# Patient Record
(demographics unavailable — no encounter records)

---

## 2024-12-09 NOTE — HISTORY OF PRESENT ILLNESS
[de-identified] : PATIENT COMPLAINS OF RIGHT LOWER HIP  PATIENT STATES HE STARTED EXPERIENCING PAIN FROM THE RT HIP REGION SHOOTING DOWN TO THE LEG. PATIENT REPORTS NUMBNESS IN THE RIGHT FOOT AS WELL. PAIN BEGAN THE DAY BEFORE AND GOT WORSE THE PREVIOUS DAY. 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 (PULLING WEIGHT APRROX 70-80 LBS) IS YOUR PROBLEM GETTING WORSE - FEELS THE SAME  IS THIS WORKERS COMPENSATION INJURY/ NO FAULT: NO PAIN IS CONSTANT PAIN   4-5/10 DESCRIPTION OF PAIN: DULL, THROBBING WORSE AT PARTICULAR TIME OF DAY: NO PRIOR/CURRENT TREATMENTS: OTC MEDICATION - HELFUL  AGGRAVATING FACTORS:  WALKING, GOING UP STAIRS - WEAKNESS OCCURS IN THE RIGHT LEG  ANY SYMPTOMS: NONE  ASSOCIATED NUMBNESS    PREVIOUS DISLOCATION- NO HAS HAD PHYSICAL THERAPY WITHOUT RELIEF- NO HAS HAD PREVIOUS SURGERY- NO HAS HAD PREVIOUS INJECTION - NO  HAS HAD PREVIOUS IMAGING -  NO    Hemigard Intro: Due to skin fragility and wound tension, it was decided to use HEMIGARD adhesive retention suture devices to permit a linear closure. The skin was cleaned and dried for a 6cm distance away from the wound. Excessive hair, if present, was removed to allow for adhesion.

## 2024-12-09 NOTE — PHYSICAL EXAM
[de-identified] : .XRAY RIGHT HIP MILD JOINT NARROWING , SHELVING OSTEOPHYTE  CALCIFICATION IN THE ANTERIOR CAPSULE REGION   XRAYS LUMBAR SPINE - 2 VIEWS NO FRACTURE  -  NARROWING L45, L5SI DISC SPACED  10% LYSTHESIS L45

## 2024-12-09 NOTE — DISCUSSION/SUMMARY
[de-identified] : MEDROL DOSE PACK PROGRES DICLOFENAC HOME EXERCISES  PT REFERRAL PROVIDED  MRI IF NO RELIEF

## 2025-06-27 NOTE — HISTORY OF PRESENT ILLNESS
[de-identified] : REASON: BILATERAL  KNEE PAIN ( R>L)  DURATION: PAIN STARED JUNE 17, 2025- PT WAS WORKING OUT AT THE GYM , SQUATS ON MACHINE, KNEE GAVE OUT  DESCRIPTION OF PAIN: ACHY  PAIN LEVEL: 5/10 INTERMITTENT PRIOR TREATMENTS: REST, ICE, COMPRESSION, ELEVATIOB, ADVIL  AGGRAVATING FACTORS: USING THE STAIRS, AT NIGHT    HAS HAD PHYSICAL THERAPY - NO HAS HAD PREVIOUS SURGERY- NO HAS HAD PREVIOUS INJECTION -  NO

## 2025-06-27 NOTE — PHYSICAL EXAM
[de-identified] : PHYSICAL EXAM BILATERAL KNEES  MODERATE TO LARGE EFFUSION  AROM RIGHT  0- 105 LEFT    0-125  SPECIAL TESTS  PATELLAR GRIND = GRIND  DRAWER  = NEG LACHMAN = NEG MACMURRAY = PAINFUL   MOTOR = GROSSLY INTACT SENSORY = GROSSLY INTACT     [de-identified] : XRAY RIGHT KNEE: 4 views of the knee 3 COMPARTMENT GRADE 3-4  OSTEOARTHRITIS  No obvious fracture or dislocation.  Alignment within normal limits   XRAY LEFT KNEE: 4 views of the knee 3 COMPARTMENT GRADE 3-4  OSTEOARTHRITIS  No obvious fracture or dislocation.  Alignment within normal limits

## 2025-06-27 NOTE — DISCUSSION/SUMMARY
[de-identified] : XRAYS REVEAL KNEE OSTEOARTHRITIS LITTLE RELIEF FROM NSAIDS , EXERCISES PATIENT HAS ELECTED TO PROCEED WITH KENALOG INJECTION KNEE  RISKS AND BENEFITS DISCUSSED - VERBAL CONSENT OBTAINED SEE PROCEDURE NOTE     POST INJECTION INSTRUCTIONS:   INJECTION THERAPY HANDOUT PROVIDED   COLD THERAPY , DICLOFENAC  PRN    ELASTIC KNEE SUPPORT PRN   MRI ORDERED

## 2025-06-27 NOTE — PROCEDURE
[de-identified] : INJECTION / ASPIRATION BILATERAL KNEES   Patient has demonstrated limited relief from NSAIDS, rest, exercises / PT , and after discussion of the risks and benefits, the patient has elected to proceed with a n ULTRASOUND GUIDED corticosteroid injection into the BILATERAL SupeLat PF Knee   Confirmed that the patient does not have history of prior adverse reactions, active, infections, or relevant allergies. There was no effusion, erythema, or warmth, and the skin was clear   The skin was sterilized with alcohol. Ethyl Chloride was used as a topical anesthetic. Routine sterile technique.   Utilizing ULTRASOUND GUIDANCE, needle placement was confirmed in the PF KNEE joint , the  KNEE JOINT  was then injected with KENALOG + LIDOCAINE. The injection was completed without complication and a bandage was applied.   The patient tolerated the procedure well and was given post-injection instructions.Rec: Cold therapy, analgesics, avoid heavy activity.   MEDICATION: Lidocaine 1% 4cc + 10mg of Kenalog LOT# [id number] EXP MARCH 2026  EFFUSION  - RIGHT - 44CC BLOOD TINGED , LEFT = 45CC BLOOD TINGED

## 2025-07-14 NOTE — DISCUSSION/SUMMARY
[de-identified] : NO WEIGHT BEARING SPORTS OR GYM  CALCIUM CITRATE  WITH VIT D 1200- 2000MG DAILY  RE EVAL AFTER 6 WEEK  DEXA AFTER FOLLOWUP

## 2025-07-14 NOTE — PHYSICAL EXAM
[de-identified] : PHYSICAL EXAM BILATERAL KNEES - last visit   MODERATE TO LARGE EFFUSION  AROM RIGHT  0- 105 LEFT    0-125  SPECIAL TESTS  PATELLAR GRIND = GRIND  DRAWER  = NEG LACHMAN = NEG MACMURRAY = PAINFUL   MOTOR = GROSSLY INTACT SENSORY = GROSSLY INTACT     [de-identified] : Date of Exam: 07-   EXAM:  MRI RIGHT KNEE WITHOUT CONTRAST  HISTORY: Knee pain. Injury on 6/18/2025.  TECHNIQUE:  Multiplanar, multi-sequence MRI of the right knee was obtained on a 1.2T scanner according to standard protocol.  COMPARISON: 7/12/2013 MRI.    IMPRESSION:  MRI of the right knee demonstrates:   1.  Complex tear of the posterior horn and body of the medial meniscus with horizontal and radial components. 2.  Radial free edge tear of the body of the lateral meniscus near full-thickness radial tear of anterior horn. Extrusion of the meniscal body. 3.  High-grade partial midsubstance ACL tear, possibly superimposed on mucoid cystic degeneration. 4.  Partial fibular collateral ligament tear. 5.  Nondisplaced subchondral impaction fractures at the posterior lateral tibial plateau, periphery of the lateral femoral condyle, and posterior medial tibial plateau. Contusion at the fibular head. 6.  Severe tricompartmental osteoarthritis. 7.  Findings which may occur in the setting of patellar maltracking. 8.  Large joint effusion with synovitis.

## 2025-07-14 NOTE — HISTORY OF PRESENT ILLNESS
[de-identified] : PATIENT REQUESTS AND CONSENTS TO CONSULT AND TREATMENT RECOMMENDATIONS VIA TELEHEALTH IDENTITY AND   CONFIRMED, VERBAL CONSENT FOR TELEHEALTH APPOINTMENT OBTAINED  UNDERSTANDS POTENTIAL PRIVACY RISKS INHERENT TO TELEHEALTH  REASON: BILATERAL  KNEE PAIN IMPROVING  ABLE TO GET UP AND DOWN STAIRS ALEVE Q12 HOURS   DURATION: PAIN STARED 2025- PT WAS WORKING OUT AT THE GYM , SQUATS ON MACHINE, KNEE GAVE OUT  DESCRIPTION OF PAIN: ACHY  PAIN LEVEL: 5/10 INTERMITTENT PRIOR TREATMENTS: REST, ICE, COMPRESSION, ELEVATIOB, ADVIL  AGGRAVATING FACTORS: USING THE STAIRS, AT NIGHT    HAS HAD PHYSICAL THERAPY - NO HAS HAD PREVIOUS SURGERY- NO HAS HAD PREVIOUS INJECTION -  NO